# Patient Record
Sex: FEMALE | Race: WHITE
[De-identification: names, ages, dates, MRNs, and addresses within clinical notes are randomized per-mention and may not be internally consistent; named-entity substitution may affect disease eponyms.]

---

## 2019-10-23 ENCOUNTER — HOSPITAL ENCOUNTER (EMERGENCY)
Dept: HOSPITAL 62 - ER | Age: 84
Discharge: HOME | End: 2019-10-23
Payer: MEDICARE

## 2019-10-23 VITALS — DIASTOLIC BLOOD PRESSURE: 77 MMHG | SYSTOLIC BLOOD PRESSURE: 128 MMHG

## 2019-10-23 DIAGNOSIS — Z79.02: ICD-10-CM

## 2019-10-23 DIAGNOSIS — I50.9: ICD-10-CM

## 2019-10-23 DIAGNOSIS — Z88.0: ICD-10-CM

## 2019-10-23 DIAGNOSIS — R06.00: ICD-10-CM

## 2019-10-23 DIAGNOSIS — Z88.8: ICD-10-CM

## 2019-10-23 DIAGNOSIS — I48.91: Primary | ICD-10-CM

## 2019-10-23 DIAGNOSIS — Z88.2: ICD-10-CM

## 2019-10-23 DIAGNOSIS — Z79.82: ICD-10-CM

## 2019-10-23 DIAGNOSIS — R05: ICD-10-CM

## 2019-10-23 DIAGNOSIS — I11.0: ICD-10-CM

## 2019-10-23 DIAGNOSIS — Z79.899: ICD-10-CM

## 2019-10-23 LAB
ADD MANUAL DIFF: NO
ALBUMIN SERPL-MCNC: 3.6 G/DL (ref 3.5–5)
ALP SERPL-CCNC: 53 U/L (ref 38–126)
ANION GAP SERPL CALC-SCNC: 10 MMOL/L (ref 5–19)
AST SERPL-CCNC: 26 U/L (ref 14–36)
BASOPHILS # BLD AUTO: 0.1 10^3/UL (ref 0–0.2)
BASOPHILS NFR BLD AUTO: 0.8 % (ref 0–2)
BILIRUB DIRECT SERPL-MCNC: 0.2 MG/DL (ref 0–0.4)
BILIRUB SERPL-MCNC: 0.4 MG/DL (ref 0.2–1.3)
BUN SERPL-MCNC: 30 MG/DL (ref 7–20)
CALCIUM: 9.6 MG/DL (ref 8.4–10.2)
CHLORIDE SERPL-SCNC: 108 MMOL/L (ref 98–107)
CK MB SERPL-MCNC: 0.61 NG/ML (ref ?–4.55)
CK SERPL-CCNC: 32 U/L (ref 30–135)
CO2 SERPL-SCNC: 25 MMOL/L (ref 22–30)
EOSINOPHIL # BLD AUTO: 0.3 10^3/UL (ref 0–0.6)
EOSINOPHIL NFR BLD AUTO: 3.9 % (ref 0–6)
ERYTHROCYTE [DISTWIDTH] IN BLOOD BY AUTOMATED COUNT: 11.9 % (ref 11.5–14)
GLUCOSE SERPL-MCNC: 147 MG/DL (ref 75–110)
HCT VFR BLD CALC: 43 % (ref 36–47)
HGB BLD-MCNC: 14.5 G/DL (ref 12–15.5)
INR PPP: 1.25
LYMPHOCYTES # BLD AUTO: 1.2 10^3/UL (ref 0.5–4.7)
LYMPHOCYTES NFR BLD AUTO: 17.3 % (ref 13–45)
MCH RBC QN AUTO: 32.3 PG (ref 27–33.4)
MCHC RBC AUTO-ENTMCNC: 33.8 G/DL (ref 32–36)
MCV RBC AUTO: 96 FL (ref 80–97)
MONOCYTES # BLD AUTO: 0.7 10^3/UL (ref 0.1–1.4)
MONOCYTES NFR BLD AUTO: 10.1 % (ref 3–13)
NEUTROPHILS # BLD AUTO: 4.6 10^3/UL (ref 1.7–8.2)
NEUTS SEG NFR BLD AUTO: 67.9 % (ref 42–78)
NT PRO BNP: 3620 PG/ML (ref ?–450)
PLATELET # BLD: 269 10^3/UL (ref 150–450)
POTASSIUM SERPL-SCNC: 4.1 MMOL/L (ref 3.6–5)
PROT SERPL-MCNC: 7.2 G/DL (ref 6.3–8.2)
PROTHROMBIN TIME: 15.8 SEC (ref 11.4–15.4)
RBC # BLD AUTO: 4.49 10^6/UL (ref 3.72–5.28)
TOTAL CELLS COUNTED % (AUTO): 100 %
TROPONIN I SERPL-MCNC: < 0.012 NG/ML
WBC # BLD AUTO: 6.8 10^3/UL (ref 4–10.5)

## 2019-10-23 PROCEDURE — 82550 ASSAY OF CK (CPK): CPT

## 2019-10-23 PROCEDURE — 85025 COMPLETE CBC W/AUTO DIFF WBC: CPT

## 2019-10-23 PROCEDURE — 71045 X-RAY EXAM CHEST 1 VIEW: CPT

## 2019-10-23 PROCEDURE — 70450 CT HEAD/BRAIN W/O DYE: CPT

## 2019-10-23 PROCEDURE — 93010 ELECTROCARDIOGRAM REPORT: CPT

## 2019-10-23 PROCEDURE — 99285 EMERGENCY DEPT VISIT HI MDM: CPT

## 2019-10-23 PROCEDURE — 82553 CREATINE MB FRACTION: CPT

## 2019-10-23 PROCEDURE — 80053 COMPREHEN METABOLIC PANEL: CPT

## 2019-10-23 PROCEDURE — 96374 THER/PROPH/DIAG INJ IV PUSH: CPT

## 2019-10-23 PROCEDURE — 83880 ASSAY OF NATRIURETIC PEPTIDE: CPT

## 2019-10-23 PROCEDURE — 85610 PROTHROMBIN TIME: CPT

## 2019-10-23 PROCEDURE — 36415 COLL VENOUS BLD VENIPUNCTURE: CPT

## 2019-10-23 PROCEDURE — 93005 ELECTROCARDIOGRAM TRACING: CPT

## 2019-10-23 PROCEDURE — 84484 ASSAY OF TROPONIN QUANT: CPT

## 2019-10-23 NOTE — RADIOLOGY REPORT (SQ)
EXAM DESCRIPTION:  CT HEAD WITHOUT



COMPLETED DATE/TIME:  10/23/2019 11:10 am



REASON FOR STUDY:  Occipital headache, on Eliquis



COMPARISON:  12/27/2016



TECHNIQUE:  Axial images acquired through the brain without intravenous contrast.  Images reviewed wi
th bone, brain and subdural windows.  Additional sagittal and coronal reconstructions were generated.
 Images stored on PACS.

All CT scanners at this facility use dose modulation, iterative reconstruction, and/or weight based d
osing when appropriate to reduce radiation dose to as low as reasonably achievable (ALARA).

[facility]: Dose Right  [facility]: CareDose    [facility]: Dose Right    [facility]: Teradose 4D    [facility]: Smart Technologies



RADIATION DOSE:   mGy.



LIMITATIONS:  None.



FINDINGS:  VENTRICLES: Normal size and contour.

CEREBRUM: No masses.  No hemorrhage.  No midline shift.  No evidence for acute infarction. Normal gra
y/white matter differentiation. No areas of low density in the white matter.

CEREBELLUM: No masses.  No hemorrhage.  No alteration of density.  No evidence for acute infarction.

EXTRAAXIAL SPACES: No fluid collections.  No masses.

ORBITS AND GLOBE: No intra- or extraconal masses.  Normal contour of globe without masses.

CALVARIUM: No fracture.

PARANASAL SINUSES: No fluid or mucosal thickening.

SOFT TISSUES: No mass or hematoma.

OTHER: No other significant finding.



IMPRESSION:  NORMAL BRAIN CT WITHOUT CONTRAST.

EVIDENCE OF ACUTE STROKE: NO.



COMMENT:  Quality ID # 436: Final reports with documentation of one or more dose reduction techniques
 (e.g., Automated exposure control, adjustment of the mA and/or kV according to patient size, use of 
iterative reconstruction technique)



TECHNICAL DOCUMENTATION:  JOB ID:  [id number]

 2011 [employer]- All Rights Reserved



Reading location - IP/workstation name: [email or web address]

## 2019-10-23 NOTE — RADIOLOGY REPORT (SQ)
EXAM DESCRIPTION:  CHEST SINGLE VIEW



COMPLETED DATE/TIME:  10/23/2019 11:31 am



REASON FOR STUDY:  S OB, Hx atrial fib



COMPARISON:  None.



NUMBER OF VIEWS:  One view.



TECHNIQUE:  Single frontal radiographic view of the chest acquired.



LIMITATIONS:  None.



FINDINGS:  LUNGS AND PLEURA: No opacities, masses or pneumothorax. No pleural effusion.

MEDIASTINUM AND HILAR STRUCTURES: No masses.  Contour normal.

HEART AND VASCULAR STRUCTURES: Heart normal in size.  Normal vasculature.

BONES: No acute findings.

HARDWARE: None in the chest.

OTHER: No other significant finding.



IMPRESSION:  NO SIGNIFICANT RADIOGRAPHIC FINDING IN THE CHEST.



TECHNICAL DOCUMENTATION:  JOB ID:  [id number]

 2011 [employer]- All Rights Reserved



Reading location - IP/workstation name: [email or web address]

## 2019-10-23 NOTE — EKG REPORT
SEVERITY:- ABNORMAL ECG -

ATRIAL FIBRILLATION

REPOL ABNRM SUGGESTS ISCHEMIA, ANT-LAT LEADS

:

Confirmed by: Renae Chavarria MD 23-Oct-2019 18:58:35

## 2019-10-23 NOTE — ER DOCUMENT REPORT
ED Respiratory Problem





- General


Chief Complaint: Shortness Of Breath


Stated Complaint: SHORTNESS OF BREATH/WEAKNESS


Time Seen by Provider: 10/23/19 10:33


Primary Care Provider: 


SHAQ BOX PA-C [Primary Care Provider] - Follow up as needed


Notes: 





Patient says that she is having increasing difficulty breathing over the past 3 

to 4 days.  She has had this previously.  She was admitted to the hospital on a 

visit to [place] in August and was treated for passing out.  She was discovered

to have atrial fibrillation at that time.  She is on Eliquis and an aspirin 

daily.  She is also on metoprolol and Cardizem p.o.  Denies any chest pains.  No

fevers.  Has had some cough.





Patient has a history of hypertension and high cholesterol.  Otherwise, no other

significant past medical history my name.





TRAVEL OUTSIDE OF THE [place] IN LAST 30 DAYS: No





- Related Data


Allergies/Adverse Reactions: 


                                        





Penicillins Allergy (Verified 10/23/19 10:18)


   


quinapril [From Accupril] Allergy (Verified 10/23/19 10:18)


   


Sulfa (Sulfonamide Antibiotics) Allergy (Verified 10/23/19 10:18)


   








Home Medications: metoprolol, diltiazam, eloquis, aspirin





Past Medical History





- Social History


Smoking Status: Never Smoker


Chew tobacco use (# tins/day): No


Frequency of alcohol use: None


Drug Abuse: None


Family History: Reviewed & Not Pertinent


Patient has suicidal ideation: No


Patient has homicidal ideation: No





- Past Medical History


Cardiac Medical History: Reports: Hx Atrial Fibrillation, Hx 

Hypercholesterolemia, Hx Hypertension


Pulmonary Medical History: 


   Denies: Hx Asthma, Hx COPD


Endocrine Medical History: Denies: Hx Diabetes Mellitus Type 1, Hx Diabetes 

Mellitus Type 2





Review of Systems





- Review of Systems


Notes: 





REVIEW OF SYSTEMS:





CONSTITUTIONAL :  Denies fever.


  


EENT:   Denies eye, ear, nose or mouth or throat pain or other symptoms.





CARDIOVASCULAR:  Denies chest pain.





RESPIRATORY: See HPI.  Patient has developed a cough recently..





GASTROINTESTINAL:  Denies abdominal pain or nausea, vomiting, or diarrhea.





GENITOURINARY:  Denies difficulty or painful urinating, urinary frequency, blood

in urine.





MUSCULOSKELETAL:  Denies back or neck pain.  Denies joint pain or swelling.





SKIN:   Denies rash or skin lesions.





NEUROLOGICAL:  Denies LOC or altered mental status.  Denies headache.  Denies 

sensory loss or motor deficits.





ALL OTHER SYSTEMS REVIEWED AND NEGATIVE.





Physical Exam





- Vital signs


Vitals: 


                                        











Resp


 


 18 


 


 10/23/19 10:16











Interpretation: Tachycardic - Ventricular heart rate about 120 in triage..  No: 

Hypoxic, Febrile


Notes: 





PHYSICAL EXAMINATION:





GENERAL: Well-appearing, in no acute distress.





HEAD: Atraumatic, normocephalic.





EYES: Pupils equal round and reactive to light, extraocular movements intact.





ENT: oropharynx clear without exudates.  Moist mucous membranes.





NECK: Normal range of motion, supple.





LUNGS: Breath sounds clear and equal bilaterally.





HEART: Irregularly irregular rate and rhythm without murmurs and 110 to 115 rate





ABDOMEN: Soft, nontender.  No guarding or rebound.  No masses.





BACK:  No tenderness throughout entire back.





EXTREMITIES: Normal range of motion without pain.  No edema.  Negative Homans 

bilaterally.





NEUROLOGICAL:  Normal speech, normal gait.  Normal sensory, motor, and reflex 

exams.  Awake, alert, and oriented x3.  





PSYCH: Normal mood, normal affect.  Patient anxious and very worried appearing. 

May be somewhat depressed, as well.





SKIN: Warm, dry, no rashes.





Course





- Re-evaluation


Re-evalutation: 





10/23/19 20:19


Patient remained stable throughout her stay in the department.


I spoke with Dr. Forbes, her cardiologist, who says he is not actually not seen 

the patient yet, but was aware of the patient because her daughter had brought 

her to his office this afternoon and then decided to bring her to the emergency 

department.  Patient appears to be stable and he and I both feel that she can be

discharged home with an increase in her metoprolol to 50 mg in the evenings and 

none in the mornings instead of her current 25 mg in the morning.  He will 

arrange for her to be seen in their office in the coming few days.








- Vital Signs


Vital signs: 


                                        











Temp Pulse Resp BP Pulse Ox


 


 98.2 F   120 H  21 H  128/77 H  96 


 


 10/23/19 16:53  10/23/19 10:27  10/23/19 16:53  10/23/19 16:53  10/23/19 16:53














- Laboratory


Result Diagrams: 


                                 10/23/19 11:01





                                 10/23/19 11:01


Laboratory results interpreted by me: 


                                        











  10/23/19 10/23/19 10/23/19





  11:01 11:01 11:01


 


PT  15.8 H  


 


Chloride    108 H


 


BUN    30 H


 


Creatinine    1.33 H


 


Est GFR ( Amer)    46 L


 


Est GFR (MDRD) Non-Af    38 L


 


Glucose    147 H


 


NT-Pro-B Natriuret Pep   3620 H 














- EKG Interpretation by Me


Rate: Tachycardia


Rhythm: A.Fib - Rate 122


Additional EKG results interpreted by me: 





10/23/19 15:31


Patient has nonspecific ST changes on her EKG.





Discharge





- Discharge


Clinical Impression: 


 Atrial fibrillation with RVR, Congestive heart failure (CHF)





Condition: Stable


Disposition: HOME, SELF-CARE


Additional Instructions: 


Dyspnea, Nonspecific


   You were evaluated for shortness of breath, or dyspnea. Dyspnea has many 

causes, and some are more serious than others. Sometimes it's impossible to 

diagnose the cause of dyspnea with the tests that are available on an emergency 

basis. Based on our evaluation today, you do not need hospitalization now. We 

found no evidence of pneumonia, collapsed lung, blood clots in the lung, tumors,

or heart failure.


     Causes of non-specific dyspnea can include asthma or bronchospasm, 

hyperventilation, emotional distress, heart disease, emphysema, fibrosis of the 

lung, and stiffness of the chest wall.


     In healthy individuals with a single episode, it's sometimes reasonable to 

do nothing but wait to see if the problem occurs again. Additional tests used to

evaluate dyspnea can include cardiac stress testing, echocardiography, pulmonary

function testing, CAT scan of the chest, bronchoscopy or pulmonary biopsy.


     Return if shortness of breath persists or worsens, or if you develop chest 

pain, fever, cough, confusion, or fainting.





Atrial Fibrillation


     Atrial fibrillation is an abnormal heart rhythm, caused by irregular 

electrical circuits in the upper heart chamber.  It can be caused by heart valve

disease, hardening of the arteries, or metabolic problems such as thyroid 

disease, or may occur without a clear cause. Atrial fibrillation may occur only 

occasionally, or may be chronic.


     Atrial fibrillation often results in a very fast heart rate, with 

palpitations, lightheadedness, and shortness of breath.  Treatment is to slow 

the abnormally fast rate, and to convert the rhythm back to normal, if possible.

 Many patients stay in atrial fibrillation for years without symptoms or 

complications.  Your doctor will decide whether you can be converted back to a 

normal heart rhythm.


     Contact the doctor or emergency medical system at once if you develop chest

pain, shortness of breath, or severe lightheadedness, or if you develop any 

disturbance of consciousness, problems with speech, or localized weakness.





NORMAL EXAM AND WORKUP:


     At this time, except for the already known atrial fibrillation, your 

examination and workup show no significant abnormality.  No significant abnormal

physical findings were noted.  All laboratory, EKG, and imaging (x-ray, CT 

scans, ultrasound) studies that were ordered show no significant abnormality.


     Although your examination and all studies that were ordered showed no 

significant abnormal finding, there are no examinations and no studies that are 

100% accurate.  There is always the possibility that some abnormality could ex

ist and not be detected with physical examination or within the limits and 

capabilities of laboratory and other studies.


     You should return or follow up as you were instructed on your visit today 

for further evaluation if your symptoms do not resolve.





To try to slow your heart rate down slightly, Dr. Forbes recommended you 

increase your metoprolol and take 50 mg in the evening instead of 25 mg in the 

morning.  You should start taking the 50 mg in the evening tomorrow evening, 

that tomorrow, Thursday evening.





FOLLOW-UP CARE:


If you have been referred to a physician for follow-up care, call the 

physicians office for an appointment as you were instructed or within the next 

two days.  If you experience worsening or a significant change in your symptoms,

notify the physician immediately or return to the Emergency Department at any 

time for re-evaluation.





Your cardiologist, Dr. Forbes, will see you in his office in follow-up in the 

next week.





If you develop any new or worsening symptoms, return for us to reevaluate your 

condition.





Stop taking your metoprolol 25 mg every morning and start taking your metoprolol

50 mg every evening, beginning tomorrow.








Referrals: 


SHAQ BOX PA-C [Primary Care Provider] - Follow up as needed

## 2020-02-07 ENCOUNTER — HOSPITAL ENCOUNTER (OUTPATIENT)
Dept: HOSPITAL 62 - WI | Age: 85
End: 2020-02-07
Attending: INTERNAL MEDICINE
Payer: MEDICARE

## 2020-02-07 DIAGNOSIS — N18.3: Primary | ICD-10-CM

## 2020-02-07 PROCEDURE — 76770 US EXAM ABDO BACK WALL COMP: CPT

## 2020-02-07 NOTE — WOMENS IMAGING REPORT
EXAM DESCRIPTION:  RETROPERITONEAL U/S



COMPLETED DATE/TIME:  2/7/2020 11:33 am



REASON FOR STUDY:  N18.3 CHRONIC KIDNEY DISEASE, STAGE 3 (MODERATE) N18.3  CHRONIC KIDNEY DISEASE, ST
AGE 3 (MODERATE)



COMPARISON:  None.



TECHNIQUE:  Dynamic and static grayscale images acquired of the kidneys and bladder and recorded on P
ACS. Additional selected color Doppler and spectral images recorded.



LIMITATIONS:  None.



FINDINGS:  RIGHT KIDNEY:  The right kidney measures 8.8 x 4.3 x 4.0 cm, small in size.  Diffuse incre
ased echogenicity of the kidney, likely consistent with the patient's known underlying history of lyndsey
al disease.  No solid or suspicious masses.   No hydronephrosis.   No calcifications.

LEFT KIDNEY:  The left kidney measures 9.2 x 4.3 x 4.5 cm, small in size.  Diffuse increased echogeni
city of the kidney, likely consistent with the patient's known history of renal disease. No solid or 
suspicious masses.   No hydronephrosis.   No calcifications.

BLADDER:  The urinary bladder is incompletely distended.

OTHER FINDINGS:  Incidentally, a complex anechoic 6.9 x 6.6 x 6.2 cm exophytic mass adjacent to the m
edial margin of the right lobe of the liver.  Marked diffuse peripheral blood flow is demonstrated.



IMPRESSION:  1.  Diffuse increased echogenicity of the kidneys, likely consistent with the patient's 
known history of underlying renal disease.

2.  No evidence of hydronephrosis.

3.  Incidentally, a large exophytic, complex anechoic mass adjacent to the medial margin of the right
 l hepatic lobe with diffuse marked peripheral blood flow demonstrated.  Further evaluation CT Liver 
with and without IV contrast.



TECHNICAL DOCUMENTATION:  JOB ID:  [id number]

 2011 [employer]- All Rights Reserved



Reading location - IP/workstation name: [email or web address]

## 2020-03-30 ENCOUNTER — HOSPITAL ENCOUNTER (OUTPATIENT)
Dept: HOSPITAL 62 - RAD | Age: 85
End: 2020-03-30
Attending: INTERNAL MEDICINE
Payer: MEDICARE

## 2020-03-30 DIAGNOSIS — K76.89: ICD-10-CM

## 2020-03-30 DIAGNOSIS — R19.00: Primary | ICD-10-CM

## 2020-03-30 PROCEDURE — 74150 CT ABDOMEN W/O CONTRAST: CPT

## 2020-03-30 NOTE — RADIOLOGY REPORT (SQ)
EXAM DESCRIPTION:  CT ABDOMEN NO ORAL OR IV



IMAGES COMPLETED DATE/TIME:  3/30/2020 11:04 am



REASON FOR STUDY:  R19.00 INTRA-ABDOMINAL AND PELVIC SWELLING, MASS AND LUMP, UNSPECIFIED SITE N18.3 
 CHRONIC KIDNEY DISEASE, STAGE 3 (MODERATE) R19.00  INTRA-ABD AND PELVIC SWELLING, MASS AND LUMP, UNS
P SI



COMPARISON:  None.



TECHNIQUE:  CT scan of the abdomen performed without intravenous contrast and without oral contrast. 
 Images reviewed with lung, soft tissue, and bone windows.  Reconstructed coronal and sagittal MPR im
ages reviewed.  All images stored on PACS.

All CT scanners at this facility use dose modulation, iterative reconstruction, and/or weight based d
osing when appropriate to reduce radiation dose to as low as reasonably achievable (ALARA).

[facility]: Dose Right  [facility]: CareDose    [facility]: Dose Right    [facility]: Teradose 4D    [facility]: [employer]



RADIATION DOSE:  CT Rad equipment meets quality standard of care and radiation dose reduction techniq
ues were employed. CTDIvol: 6.1 mGy. DLP: 202 mGy-cm.mGy.



LIMITATIONS:  None.



FINDINGS:  LOWER CHEST: No significant findings. No nodules or infiltrates.

NONCONTRASTED LIVER, SPLEEN, ADRENALS: Hepatic cysts are present.  These include a 79 x 62 x 68 mm cy
st arising from the medial aspect of the right lobe.  A small calcification is present on image 44 se
skylar 601 and on image 24 series 2.  The spleen and adrenal glands are normal.

PANCREAS: No masses.  No peripancreatic inflammatory changes.

GALLBLADDER: Surgically absent.

RIGHT KIDNEY AND URETER: No suspicious masses. Assessment limited by lack of IV contrast.   No signif
icant calcifications.   No hydronephrosis or hydroureter.

LEFT KIDNEY AND URETER: No suspicious masses. Assessment limited by lack of IV contrast.   No signifi
cant calcifications.   No hydronephrosis or hydroureter.

AORTA AND RETROPERITONEUM: No aneurysm. No retroperitoneal masses or adenopathy.

BOWEL AND PERITONEAL CAVITY: No obvious masses or inflammatory changes.  No free fluid.

APPENDIX: Not included.

ABDOMINAL WALL: No abdominal wall hernias.

BONES: No significant findings.

OTHER: No other significant finding.



IMPRESSION:   Hepatic cysts, including a 79 x 62 x 68 mm cyst arising from the medial aspect of the r
ight lobe of the liver.



TECHNICAL DOCUMENTATION:  JOB ID:  [id number]

Quality ID # 436: Final reports with documentation of one or more dose reduction techniques (e.g., Au
tomated exposure control, adjustment of the mA and/or kV according to patient size, use of iterative 
reconstruction technique)

 2011 [employer]- All Rights Reserved



Reading location - IP/workstation name: [email or web address]

## 2020-06-29 ENCOUNTER — HOSPITAL ENCOUNTER (OUTPATIENT)
Dept: HOSPITAL 62 - OD | Age: 85
End: 2020-06-29
Attending: INTERNAL MEDICINE
Payer: MEDICARE

## 2020-06-29 DIAGNOSIS — N18.3: ICD-10-CM

## 2020-06-29 DIAGNOSIS — I12.9: Primary | ICD-10-CM

## 2020-06-29 DIAGNOSIS — I48.0: ICD-10-CM

## 2020-06-29 LAB
ALBUMIN SERPL-MCNC: 3.8 G/DL (ref 3.5–5)
ALP SERPL-CCNC: 59 U/L (ref 38–126)
ANION GAP SERPL CALC-SCNC: 6 MMOL/L (ref 5–19)
AST SERPL-CCNC: 24 U/L (ref 14–36)
BILIRUB DIRECT SERPL-MCNC: 0 MG/DL (ref 0–0.4)
BILIRUB SERPL-MCNC: 0.5 MG/DL (ref 0.2–1.3)
BUN SERPL-MCNC: 37 MG/DL (ref 7–20)
CALCIUM: 9.5 MG/DL (ref 8.4–10.2)
CHLORIDE SERPL-SCNC: 100 MMOL/L (ref 98–107)
CO2 SERPL-SCNC: 34 MMOL/L (ref 22–30)
GLUCOSE SERPL-MCNC: 99 MG/DL (ref 75–110)
POTASSIUM SERPL-SCNC: 4.1 MMOL/L (ref 3.6–5)
PROT SERPL-MCNC: 7.2 G/DL (ref 6.3–8.2)

## 2020-06-29 PROCEDURE — 82570 ASSAY OF URINE CREATININE: CPT

## 2020-06-29 PROCEDURE — 36415 COLL VENOUS BLD VENIPUNCTURE: CPT

## 2020-06-29 PROCEDURE — 80053 COMPREHEN METABOLIC PANEL: CPT

## 2020-06-29 PROCEDURE — 82043 UR ALBUMIN QUANTITATIVE: CPT

## 2020-06-30 LAB
CREAT UR-MCNC: 12 MG/DL
MICROALBUMIN UR-MCNC: 17 UG/ML